# Patient Record
Sex: FEMALE | Race: OTHER | Employment: UNEMPLOYED | URBAN - METROPOLITAN AREA
[De-identification: names, ages, dates, MRNs, and addresses within clinical notes are randomized per-mention and may not be internally consistent; named-entity substitution may affect disease eponyms.]

---

## 2024-02-24 ENCOUNTER — HOSPITAL ENCOUNTER (EMERGENCY)
Age: 34
Discharge: HOME | End: 2024-02-24
Attending: STUDENT IN AN ORGANIZED HEALTH CARE EDUCATION/TRAINING PROGRAM
Payer: COMMERCIAL

## 2024-02-24 ENCOUNTER — APPOINTMENT (OUTPATIENT)
Dept: CT IMAGING | Age: 34
End: 2024-02-24
Payer: COMMERCIAL

## 2024-02-24 VITALS
DIASTOLIC BLOOD PRESSURE: 109 MMHG | SYSTOLIC BLOOD PRESSURE: 158 MMHG | OXYGEN SATURATION: 97 % | HEART RATE: 97 BPM | TEMPERATURE: 98.5 F | RESPIRATION RATE: 18 BRPM

## 2024-02-24 DIAGNOSIS — J02.0 STREPTOCOCCAL SORE THROAT: Primary | ICD-10-CM

## 2024-02-24 DIAGNOSIS — I10 ESSENTIAL HYPERTENSION: ICD-10-CM

## 2024-02-24 DIAGNOSIS — E04.1 THYROID NODULE GREATER THAN OR EQUAL TO 1.5 CM IN DIAMETER INCIDENTALLY NOTED ON IMAGING STUDY: ICD-10-CM

## 2024-02-24 LAB
ALBUMIN SERPL-MCNC: 4.8 GM/DL (ref 3.4–5)
ALP BLD-CCNC: 84 IU/L (ref 40–129)
ALT SERPL-CCNC: 15 U/L (ref 10–40)
ANION GAP SERPL CALCULATED.3IONS-SCNC: 13 MMOL/L (ref 7–16)
AST SERPL-CCNC: 18 IU/L (ref 15–37)
BASOPHILS ABSOLUTE: 0 K/CU MM
BASOPHILS RELATIVE PERCENT: 0.4 % (ref 0–1)
BILIRUB SERPL-MCNC: 0.3 MG/DL (ref 0–1)
BUN SERPL-MCNC: 10 MG/DL (ref 6–23)
CALCIUM SERPL-MCNC: 9.2 MG/DL (ref 8.3–10.6)
CHLORIDE BLD-SCNC: 103 MMOL/L (ref 99–110)
CO2: 24 MMOL/L (ref 21–32)
CREAT SERPL-MCNC: 0.8 MG/DL (ref 0.6–1.1)
DIFFERENTIAL TYPE: ABNORMAL
EOSINOPHILS ABSOLUTE: 0.1 K/CU MM
EOSINOPHILS RELATIVE PERCENT: 0.6 % (ref 0–3)
GFR SERPL CREATININE-BSD FRML MDRD: >60 ML/MIN/1.73M2
GLUCOSE SERPL-MCNC: 86 MG/DL (ref 70–99)
HCT VFR BLD CALC: 42.7 % (ref 37–47)
HEMOGLOBIN: 13.6 GM/DL (ref 12.5–16)
IMMATURE NEUTROPHIL %: 0.3 % (ref 0–0.43)
INFLUENZA A ANTIGEN: NOT DETECTED
INFLUENZA B ANTIGEN: NOT DETECTED
LYMPHOCYTES ABSOLUTE: 2.9 K/CU MM
LYMPHOCYTES RELATIVE PERCENT: 38.1 % (ref 24–44)
Lab: NORMAL
MCH RBC QN AUTO: 28.7 PG (ref 27–31)
MCHC RBC AUTO-ENTMCNC: 31.9 % (ref 32–36)
MCV RBC AUTO: 90.1 FL (ref 78–100)
MONOCYTES ABSOLUTE: 0.8 K/CU MM
MONOCYTES RELATIVE PERCENT: 10.6 % (ref 0–4)
NUCLEATED RBC %: 0 %
PDW BLD-RTO: 13.2 % (ref 11.7–14.9)
PLATELET # BLD: 304 K/CU MM (ref 140–440)
PMV BLD AUTO: 9.1 FL (ref 7.5–11.1)
POTASSIUM SERPL-SCNC: 3.8 MMOL/L (ref 3.5–5.1)
RBC # BLD: 4.74 M/CU MM (ref 4.2–5.4)
SARS-COV-2 RDRP RESP QL NAA+PROBE: NOT DETECTED
SEGMENTED NEUTROPHILS ABSOLUTE COUNT: 3.9 K/CU MM
SEGMENTED NEUTROPHILS RELATIVE PERCENT: 50 % (ref 36–66)
SODIUM BLD-SCNC: 140 MMOL/L (ref 135–145)
SOURCE: NORMAL
SPECIMEN: NORMAL
STREP A DIRECT SCREEN: NEGATIVE
TOTAL IMMATURE NEUTOROPHIL: 0.02 K/CU MM
TOTAL NUCLEATED RBC: 0 K/CU MM
TOTAL PROTEIN: 8 GM/DL (ref 6.4–8.2)
WBC # BLD: 7.7 K/CU MM (ref 4–10.5)

## 2024-02-24 PROCEDURE — 85025 COMPLETE CBC W/AUTO DIFF WBC: CPT

## 2024-02-24 PROCEDURE — 6370000000 HC RX 637 (ALT 250 FOR IP)

## 2024-02-24 PROCEDURE — 70491 CT SOFT TISSUE NECK W/DYE: CPT

## 2024-02-24 PROCEDURE — 6360000004 HC RX CONTRAST MEDICATION

## 2024-02-24 PROCEDURE — 80053 COMPREHEN METABOLIC PANEL: CPT

## 2024-02-24 PROCEDURE — 87081 CULTURE SCREEN ONLY: CPT

## 2024-02-24 PROCEDURE — 87635 SARS-COV-2 COVID-19 AMP PRB: CPT

## 2024-02-24 PROCEDURE — 87502 INFLUENZA DNA AMP PROBE: CPT

## 2024-02-24 PROCEDURE — 87430 STREP A AG IA: CPT

## 2024-02-24 PROCEDURE — 99285 EMERGENCY DEPT VISIT HI MDM: CPT

## 2024-02-24 PROCEDURE — 6360000002 HC RX W HCPCS

## 2024-02-24 RX ORDER — IBUPROFEN 600 MG/1
600 TABLET ORAL 3 TIMES DAILY PRN
Qty: 30 TABLET | Refills: 0 | Status: SHIPPED | OUTPATIENT
Start: 2024-02-24

## 2024-02-24 RX ORDER — AMOXICILLIN AND CLAVULANATE POTASSIUM 875; 125 MG/1; MG/1
1 TABLET, FILM COATED ORAL 2 TIMES DAILY
Qty: 14 TABLET | Refills: 0 | Status: SHIPPED | OUTPATIENT
Start: 2024-02-24 | End: 2024-03-02

## 2024-02-24 RX ORDER — DEXAMETHASONE SODIUM PHOSPHATE 10 MG/ML
10 INJECTION, SOLUTION INTRAMUSCULAR; INTRAVENOUS ONCE
Status: COMPLETED | OUTPATIENT
Start: 2024-02-24 | End: 2024-02-24

## 2024-02-24 RX ORDER — AMOXICILLIN AND CLAVULANATE POTASSIUM 875; 125 MG/1; MG/1
1 TABLET, FILM COATED ORAL ONCE
Status: COMPLETED | OUTPATIENT
Start: 2024-02-24 | End: 2024-02-24

## 2024-02-24 RX ORDER — IBUPROFEN 600 MG/1
600 TABLET ORAL ONCE
Status: COMPLETED | OUTPATIENT
Start: 2024-02-24 | End: 2024-02-24

## 2024-02-24 RX ADMIN — IOPAMIDOL 75 ML: 755 INJECTION, SOLUTION INTRAVENOUS at 15:21

## 2024-02-24 RX ADMIN — AMOXICILLIN AND CLAVULANATE POTASSIUM 1 TABLET: 875; 125 TABLET, FILM COATED ORAL at 14:29

## 2024-02-24 RX ADMIN — DEXAMETHASONE SODIUM PHOSPHATE 10 MG: 10 INJECTION, SOLUTION INTRAMUSCULAR; INTRAVENOUS at 14:30

## 2024-02-24 RX ADMIN — IBUPROFEN 600 MG: 600 TABLET, FILM COATED ORAL at 14:29

## 2024-02-24 ASSESSMENT — PAIN SCALES - GENERAL: PAINLEVEL_OUTOF10: 1

## 2024-02-24 NOTE — ED PROVIDER NOTES
THIS IS MY FERNY SUPERVISORY AND SHARED VISIT NOTE    I independently examined and evaluated Ene Fernnádez.    In brief, coming with a sore throat.  Has been ongoing for 7 to 9 days.  Has not been evaluated for this so far.  Patient is otherwise healthy takes no medications.    Focused exam revealed, bilateral 2+ tonsillar swelling, I do see a exudate on the left tonsil.  Uvula is midline.  Posterior oropharynx and tonsils are erythematous.  No stridor, no wheezing no respiratory distress.  Lung sounds are clear to auscultation.  Heart reveals regular rate and rhythm without murmur.  Patient does have swelling to the right side of the neck over the thyroid, no overlying skin changes.        CC/HPI Summary, DDx, ED Course, and Reassessment:     Differential includes viral pharyngitis, strep pharyngitis, tonsillar abscess.  Patient was breathing heavily and had upper airway noises therefore CT scan was ordered to rule out deep space abscess.  Patient was administered Decadron, ibuprofen and Augmentin.  Personally I believe this is likely a viral pharyngitis.    Lab work reviewed and is unremarkable.  She has no white count or fever.  CT scan rules out tonsillar abscess does show tonsillitis/pharyngitis but no abscess.  Does find a large right thyroid nodule measuring 6.9 cm with mild localized mass effect.  General surgery was consulted by nurse practitioner to facilitate outpatient workup and reevaluation.  COVID and flu were negative.  Patient was temporarily placed on antibiotics while strep culture pending.    Patient was given the following medications:  Medications   dexAMETHasone (DECADRON) Oral 10 mg (10 mg Oral Given 2/24/24 1430)   ibuprofen (ADVIL;MOTRIN) tablet 600 mg (600 mg Oral Given 2/24/24 1429)   amoxicillin-clavulanate (AUGMENTIN) 875-125 MG per tablet 1 tablet (1 tablet Oral Given 2/24/24 1429)   iopamidol (ISOVUE-370) 76 % injection 75 mL (75 mLs IntraVENous Given 2/24/24 1521)     Discharge

## 2024-02-24 NOTE — ED PROVIDER NOTES
[facility] EMERGENCY DEPARTMENT  EMERGENCY DEPARTMENT ENCOUNTER        Pt Name: Ene Fernández  MRN: [id number]  Birthdate 1990  Date of evaluation: 2/24/2024  Provider: ROSE Daley CNP  PCP: No primary care provider on file.  Note Started: 4:26 PM EST 2/24/24       I have seen and evaluated this patient with my supervising physician Brigido France DO.      CHIEF COMPLAINT       Chief Complaint   Patient presents with    Pharyngitis     X8 days       HISTORY OF PRESENT ILLNESS: 1 or more Elements     History From: Patient    Limitations to history : Language [place] Creole    Social Determinants Significantly Affecting Health : None    Chief Complaint: Sore throat    Ene Fernández is a 33 y.o. female who presents with no significant medical history.  States that for the past 8 days she has had difficulty swallowing as well as a sore throat.  States is that she feels like her neck is swollen.  Denies any fever or respiratory distress.  Denies any cough or congestion.  Denies any ear pain or sinus pressure.  Denies any recent antibiotic use.  Denies any medication use or surgical history or medical history.  States she is still able to eat and drink and swallow but it is getting harder as the days progressed.  She states she is up-to-date on her vaccinations.  Denies any rashes dysuria hematuria nausea vomiting or diarrhea.    Nursing Notes were all reviewed and agreed with or any disagreements were addressed in the HPI.    REVIEW OF SYSTEMS :      Review of Systems    Positives and Pertinent negatives as per HPI.     SURGICAL HISTORY   History reviewed. No pertinent surgical history.    CURRENTMEDICATIONS       Discharge Medication List as of 2/24/2024  5:40 PM          ALLERGIES     Patient has no known allergies.    FAMILYHISTORY     History reviewed. No pertinent family history.     SOCIAL HISTORY       Social History     Tobacco Use    Smoking status:  below, if available at the time of this note:    CT SOFT TISSUE NECK W CONTRAST   Final Result   1. Findings consistent with acute tonsillitis/pharyngitis.  No evidence of   abscess.   2. Large right thyroid nodule measuring up to 6.9 cm with mild localized mass   effect.  Recommend nonemergent thyroid ultrasound for further evaluation as   an outpatient.   3. Mildly enlarged left jugulodigastric lymph node is likely reactive.  No   abnormally enlarged cervical lymph nodes within level 4 lymph node station.           No results found.     No results found.        PAST MEDICAL HISTORY      has no past medical history on file.     EMERGENCY DEPARTMENT COURSE and DIFFERENTIAL DIAGNOSIS/MDM:   Vitals:    Vitals:    02/24/24 1500 02/24/24 1530 02/24/24 1600 02/24/24 1741   BP: (!) 165/100 (!) 136/108 (!) 137/103 (!) 158/109   Pulse:    97   Resp:    18   Temp:       TempSrc:       SpO2:    97%       Patient was given the following medications:  Medications   dexAMETHasone (DECADRON) Oral 10 mg (10 mg Oral Given 2/24/24 1430)   ibuprofen (ADVIL;MOTRIN) tablet 600 mg (600 mg Oral Given 2/24/24 1429)   amoxicillin-clavulanate (AUGMENTIN) 875-125 MG per tablet 1 tablet (1 tablet Oral Given 2/24/24 1429)   iopamidol (ISOVUE-370) 76 % injection 75 mL (75 mLs IntraVENous Given 2/24/24 1521)             Is this patient to be included in the SEP-1 Core Measure due to severe sepsis or septic shock?   No   Exclusion criteria - the patient is NOT to be included for SEP-1 Core Measure due to:  2+ SIRS criteria are not met        Chronic Conditions affecting care:    has no past medical history on file.    CONSULTS: (Who and What was discussed)  IP CONSULT TO GENERAL SURGERY  Consult to general surgeon Dr diaz for CT findings of thyroid nodule, Dr. Jimenez stated that the patient needed to be seen as outpatient, referral to Dr. Mota for endocrinology as well as Dr. Cantu for surgery follow-up.    Records Reviewed (External and

## 2024-02-26 LAB
CULTURE: NORMAL
Lab: NORMAL
SPECIMEN: NORMAL
STREP A DIRECT SCREEN: NEGATIVE

## 2024-03-14 ENCOUNTER — OFFICE VISIT (OUTPATIENT)
Dept: SURGERY | Age: 34
End: 2024-03-14

## 2024-03-14 VITALS
WEIGHT: 186.9 LBS | DIASTOLIC BLOOD PRESSURE: 96 MMHG | OXYGEN SATURATION: 99 % | HEART RATE: 110 BPM | HEIGHT: 62 IN | SYSTOLIC BLOOD PRESSURE: 144 MMHG | BODY MASS INDEX: 34.39 KG/M2

## 2024-03-14 DIAGNOSIS — E04.9 GOITER: ICD-10-CM

## 2024-03-14 ASSESSMENT — PATIENT HEALTH QUESTIONNAIRE - PHQ9
SUM OF ALL RESPONSES TO PHQ QUESTIONS 1-9: 0
2. FEELING DOWN, DEPRESSED OR HOPELESS: NOT AT ALL
1. LITTLE INTEREST OR PLEASURE IN DOING THINGS: NOT AT ALL
SUM OF ALL RESPONSES TO PHQ QUESTIONS 1-9: 0
SUM OF ALL RESPONSES TO PHQ9 QUESTIONS 1 & 2: 0

## 2024-03-15 ENCOUNTER — HOSPITAL ENCOUNTER (OUTPATIENT)
Dept: ULTRASOUND IMAGING | Age: 34
Discharge: HOME | End: 2024-03-15
Attending: SURGERY
Payer: COMMERCIAL

## 2024-03-15 ENCOUNTER — HOSPITAL ENCOUNTER (OUTPATIENT)
Age: 34
Discharge: HOME | End: 2024-03-15
Attending: SURGERY
Payer: COMMERCIAL

## 2024-03-15 DIAGNOSIS — E04.9 GOITER: ICD-10-CM

## 2024-03-15 LAB — TSH SERPL DL<=0.005 MIU/L-ACNC: 1.6 UIU/ML (ref 0.27–4.2)

## 2024-03-15 PROCEDURE — 84436 ASSAY OF TOTAL THYROXINE: CPT

## 2024-03-15 PROCEDURE — 84443 ASSAY THYROID STIM HORMONE: CPT

## 2024-03-15 PROCEDURE — 36415 COLL VENOUS BLD VENIPUNCTURE: CPT

## 2024-03-15 PROCEDURE — 76536 US EXAM OF HEAD AND NECK: CPT

## 2024-03-15 PROCEDURE — 84480 ASSAY TRIIODOTHYRONINE (T3): CPT

## 2024-03-17 LAB
T3 SERPL-MCNC: 142 NG/DL (ref 80–200)
T4 SERPL-MCNC: 5.96 UG/DL (ref 4.5–11.7)

## 2024-04-10 ENCOUNTER — TELEPHONE (OUTPATIENT)
Dept: BARIATRICS/WEIGHT MGMT | Age: 34
End: 2024-04-10

## 2024-04-10 DIAGNOSIS — E04.9 GOITER: Primary | ICD-10-CM

## 2024-04-11 NOTE — PROGRESS NOTES
IR Procedure at [facility]: Spoke with patient through  Vick ID # [id number], and patient will arrive at 0630 at [facility] on 4/15/2024 for her procedure at 0830. Also went over below instructions.         (Paracentesis, Thoracentesis, Thyroid Bx and Injections may have a light breakfast)  2.   Follow your directions as prescribed by the doctor for your procedure and medications.  3.   Consult your provider as to when to stop blood thinner  4.   Do not take any pain medication within 6 hours of your procedure  5.   Do not drink any alcoholic beverages or use any street drugs 24 hours before procedure.  6.   Please wear simple, loose fitting clothing to the hospital.  Do not bring valuables (money,             credit cards, checkbooks, etc.)     7.   If you  have a Living Will and Durable Power of  for Healthcare, please bring in a copy.  8.   Please bring picture ID,  insurance card, paperwork from the doctors office            (H & P, Consent,  & card for implantable devices).  9.   Report to the information desk on the ground floor.  10. Take a shower the night before or morning of your procedure, do not apply any lotion, oil or powder.  11. If you are going to be sedated for the procedure, you will need a responsible adult to drive you home.

## 2024-04-15 ENCOUNTER — HOSPITAL ENCOUNTER (OUTPATIENT)
Dept: INTERVENTIONAL RADIOLOGY/VASCULAR | Age: 34
Discharge: HOME | End: 2024-04-15
Payer: COMMERCIAL

## 2024-04-15 VITALS
HEART RATE: 87 BPM | DIASTOLIC BLOOD PRESSURE: 98 MMHG | SYSTOLIC BLOOD PRESSURE: 137 MMHG | RESPIRATION RATE: 16 BRPM | OXYGEN SATURATION: 100 %

## 2024-04-15 DIAGNOSIS — E04.9 GOITER: ICD-10-CM

## 2024-04-15 PROCEDURE — 10005 FNA BX W/US GDN 1ST LES: CPT

## 2024-04-15 PROCEDURE — 2500000003 HC RX 250 WO HCPCS: Performed by: RADIOLOGY

## 2024-04-15 PROCEDURE — 88173 CYTOPATH EVAL FNA REPORT: CPT

## 2024-04-15 PROCEDURE — 88305 TISSUE EXAM BY PATHOLOGIST: CPT

## 2024-04-15 PROCEDURE — 88172 CYTP DX EVAL FNA 1ST EA SITE: CPT

## 2024-04-15 PROCEDURE — 88177 CYTP FNA EVAL EA ADDL: CPT

## 2024-04-15 PROCEDURE — 7100000010 HC PHASE II RECOVERY - FIRST 15 MIN

## 2024-04-15 PROCEDURE — 2709999900 IR BIOPSY THYROID PERC CORE NEEDLE

## 2024-04-15 RX ORDER — LIDOCAINE HYDROCHLORIDE 10 MG/ML
INJECTION, SOLUTION EPIDURAL; INFILTRATION; INTRACAUDAL; PERINEURAL PRN
Status: COMPLETED | OUTPATIENT
Start: 2024-04-15 | End: 2024-04-15

## 2024-04-15 RX ADMIN — LIDOCAINE HYDROCHLORIDE 5 ML: 10 INJECTION, SOLUTION EPIDURAL; INFILTRATION; INTRACAUDAL; PERINEURAL at 08:46

## 2024-04-15 NOTE — PROGRESS NOTES
TRANSFER - OUT REPORT:    Verbal report given to Kailey on Junie Pradeep being transferred to [facility] for routine progression of patient care       Report consisted of patient's Situation, Background, Assessment and   Recommendations(SBAR).     Information from the following report(s) Nurse Handoff Report was reviewed with the receiving nurse.    Opportunity for questions and clarification was provided.      Patient transported with:   Registered Nurse

## 2024-04-15 NOTE — BRIEF OP NOTE
Brief Postoperative Note      Patient: Ene Fernández  YOB: 1990  MRN: [id number]    Date of Procedure: 4/15/2024    PROCEDURE:  IR BIOPSY THYROID PERC CORE NEEDLE    DATE:  4/15/2024 8:54 AM    HISTORY:       Nontoxic goiter, unspecified  Nontoxic goiter, unspecified / Reason for exam:->See US from 3/15/24    PROVIDER: Dick Jewell MD    COMPARISON: 3/15/2024    TECHNIQUE: Ultrasound-guided fine-needle aspiration of a dominant right thyroid nodule    SEDATION: 1% lidocaine solution.    FINDINGS: The right neck was prepped and draped in the usual sterile fashion. The appropriate entry site was identified with ultrasound. The skin was anesthetized with 1% lidocaine solution. A 25-gauge needle was then advanced into the right thyroid nodule under ultrasound guidance. Multiple fine-needle aspirations were obtained. Specimens were assessed by a pathologist. Once complete, the final needle was removed and a sterile bandage was applied.      IMPRESSION: Technically successful fine-needle aspiration of a right thyroid nodule.    Electronically signed by Dick Jewell MD on 4/15/2024 at 9:53 AM

## 2024-04-15 NOTE — PROGRESS NOTES
Pt arrived for planned thyroid biopsy.   used throughout preprocedure.  Consent will be obtained once physican educates her regarding biopsy.

## 2024-04-25 ENCOUNTER — OFFICE VISIT (OUTPATIENT)
Dept: SURGERY | Age: 34
End: 2024-04-25
Payer: COMMERCIAL

## 2024-04-25 ENCOUNTER — TELEPHONE (OUTPATIENT)
Dept: SURGERY | Age: 34
End: 2024-04-25

## 2024-04-25 VITALS
OXYGEN SATURATION: 100 % | WEIGHT: 190.8 LBS | HEART RATE: 78 BPM | HEIGHT: 62 IN | BODY MASS INDEX: 35.11 KG/M2 | DIASTOLIC BLOOD PRESSURE: 80 MMHG | SYSTOLIC BLOOD PRESSURE: 120 MMHG

## 2024-04-25 DIAGNOSIS — E04.1 RIGHT THYROID NODULE: Primary | ICD-10-CM

## 2024-04-25 PROCEDURE — 99213 OFFICE O/P EST LOW 20 MIN: CPT | Performed by: PHYSICIAN ASSISTANT

## 2024-04-25 ASSESSMENT — ENCOUNTER SYMPTOMS
CONSTIPATION: 0
BACK PAIN: 0
CHOKING: 0
VOICE CHANGE: 1
ANAL BLEEDING: 0
SORE THROAT: 0
STRIDOR: 0
APNEA: 0
COLOR CHANGE: 0
TROUBLE SWALLOWING: 1
EYE ITCHING: 0
EYE REDNESS: 0
PHOTOPHOBIA: 0

## 2024-04-25 NOTE — PROGRESS NOTES
Chief Complaint   Patient presents with    Follow-up     F/u us results 3/15/24 bx 4/15/24      Ptq is [place] Creole speaking. The use of an  was utilized throughout the entirety of the visit today.    SUBJECTIVE:  HPI: Patient presents for evaluation ofsolitary thyroid nodule. Current symptoms include palpitations, goiter, trouble swallowing and anterior neck pain. Recent Ultrasound shows large R thyroid nodule. Patient denies history of radiation to neck.    Pt with recent IR thyroid nodule bx. Pathology was reviewed:  Final Cytologic Diagnosis:   Right Thyroid Nodule Fine Needle Aspirate cytology with cell   block:   - Negative for malignancy.   -  Consistent with follicular nodular disease (includes   adenomatoid nodule, colloid nodule, etc.)     Thyroid labs are normal.    I have reviewed the patient's(pertinent information to this visit) medical history, family history(scanned in  the Media tab under \"patient questioner\"), social history and reviewof systems with the patient today in the office.       Past Surgical History:   Procedure Laterality Date    IR BIOPSY THYROID PERC CORE NEEDLE  4/15/2024    IR BIOPSY THYROID PERC CORE NEEDLE 4/15/2024 [facility]    NO PAST SURGERIES       No past medical history on file.  Family History   Problem Relation Age of Onset    Asthma Mother      Social History     Socioeconomic History    Marital status: Single     Spouse name: Not on file    Number of children: Not on file    Years of education: Not on file    Highest education level: Not on file   Occupational History    Not on file   Tobacco Use    Smoking status: Never    Smokeless tobacco: Never   Substance and Sexual Activity    Alcohol use: Never    Drug use: Never    Sexual activity: Not on file   Other Topics Concern    Not on file   Social History Narrative    Not on file     Social Determinants of Health     Financial Resource Strain: Not on file   Food Insecurity: Not on file

## 2024-04-25 NOTE — H&P (VIEW-ONLY)
Chief Complaint   Patient presents with    Follow-up     F/u us results 3/15/24 bx 4/15/24      Ptq is [place] Creole speaking. The use of an  was utilized throughout the entirety of the visit today.    SUBJECTIVE:  HPI: Patient presents for evaluation ofsolitary thyroid nodule. Current symptoms include palpitations, goiter, trouble swallowing and anterior neck pain. Recent Ultrasound shows large R thyroid nodule. Patient denies history of radiation to neck.    Pt with recent IR thyroid nodule bx. Pathology was reviewed:  Final Cytologic Diagnosis:   Right Thyroid Nodule Fine Needle Aspirate cytology with cell   block:   - Negative for malignancy.   -  Consistent with follicular nodular disease (includes   adenomatoid nodule, colloid nodule, etc.)     Thyroid labs are normal.    I have reviewed the patient's(pertinent information to this visit) medical history, family history(scanned in  the Media tab under \"patient questioner\"), social history and reviewof systems with the patient today in the office.       Past Surgical History:   Procedure Laterality Date    IR BIOPSY THYROID PERC CORE NEEDLE  4/15/2024    IR BIOPSY THYROID PERC CORE NEEDLE 4/15/2024 [facility]    NO PAST SURGERIES       No past medical history on file.  Family History   Problem Relation Age of Onset    Asthma Mother      Social History     Socioeconomic History    Marital status: Single     Spouse name: Not on file    Number of children: Not on file    Years of education: Not on file    Highest education level: Not on file   Occupational History    Not on file   Tobacco Use    Smoking status: Never    Smokeless tobacco: Never   Substance and Sexual Activity    Alcohol use: Never    Drug use: Never    Sexual activity: Not on file   Other Topics Concern    Not on file   Social History Narrative    Not on file     Social Determinants of Health     Financial Resource Strain: Not on file   Food Insecurity: Not on file

## 2024-04-25 NOTE — TELEPHONE ENCOUNTER
SPOKE TO  Ene Fernández REGARDING SURGERY (RIGHT POSS LEFT THYROIDECTOMY) SCHEDULED @ [facility]. NOTIFIED OF DATES, TIMES AND LOCATION    PHONE ASSESSMENT   SURGERY - 5/10/24 @ 2:15 ARRIVAL @ 12:15  P/O - 5/23 @ 1:00    NPO AFTER MIDNIGHT    HOLD BLOOD THINNERS - NA

## 2024-05-09 ENCOUNTER — ANESTHESIA EVENT (OUTPATIENT)
Dept: OPERATING ROOM | Age: 34
DRG: 404 | End: 2024-05-09
Payer: COMMERCIAL

## 2024-05-09 NOTE — ANESTHESIA PRE PROCEDURE
Department of Anesthesiology  Preprocedure Note       Name:  Ene Fernández   Age:  33 y.o.  :  1990                                          MRN:  [id number]         Date:  2024      Surgeon: Surgeon(s):  Feliciano Cantu MD    Procedure: Procedure(s):  RIGHT POSSIBLE LEFT THYROID LOBECTOMY    Medications prior to admission:   Prior to Admission medications    Medication Sig Start Date End Date Taking? Authorizing Provider   ibuprofen (ADVIL;MOTRIN) 600 MG tablet Take 1 tablet by mouth 3 times daily as needed for Pain  Patient not taking: Reported on 4/15/2024 2/24/24   Eva Nelson, APRN - CNP       Current medications:    No current facility-administered medications for this encounter.     Current Outpatient Medications   Medication Sig Dispense Refill   • ibuprofen (ADVIL;MOTRIN) 600 MG tablet Take 1 tablet by mouth 3 times daily as needed for Pain (Patient not taking: Reported on 4/15/2024) 30 tablet 0       Allergies:  No Known Allergies    Problem List:  There is no problem list on file for this patient.      Past Medical History:  History reviewed. No pertinent past medical history.    Past Surgical History:        Procedure Laterality Date   • IR BIOPSY THYROID PERC CORE NEEDLE  4/15/2024    IR BIOPSY THYROID PERC CORE NEEDLE 4/15/2024 [facility]   • NO PAST SURGERIES         Social History:    Social History     Tobacco Use   • Smoking status: Never   • Smokeless tobacco: Never   Substance Use Topics   • Alcohol use: Never                                Counseling given: Not Answered      Vital Signs (Current):   Vitals:    24 0918   Weight: 86.2 kg (190 lb)   Height: 1.575 m (5' 2\")                                              BP Readings from Last 3 Encounters:   24 120/80   04/15/24 (!) 137/98   24 (!) 144/96       NPO Status:                                                                                 BMI:   Wt Readings from Last 3 Encounters:   24 86.5  kg (190 lb 12.8 oz)   03/14/24 84.8 kg (186 lb 14.4 oz)     Body mass index is 34.75 kg/m².    CBC:   Lab Results   Component Value Date/Time    WBC 7.7 02/24/2024 02:29 PM    RBC 4.74 02/24/2024 02:29 PM    HGB 13.6 02/24/2024 02:29 PM    HCT 42.7 02/24/2024 02:29 PM    MCV 90.1 02/24/2024 02:29 PM    RDW 13.2 02/24/2024 02:29 PM     02/24/2024 02:29 PM       CMP:   Lab Results   Component Value Date/Time     02/24/2024 02:29 PM    K 3.8 02/24/2024 02:29 PM     02/24/2024 02:29 PM    CO2 24 02/24/2024 02:29 PM    BUN 10 02/24/2024 02:29 PM    CREATININE 0.8 02/24/2024 02:29 PM    LABGLOM >60 02/24/2024 02:29 PM    GLUCOSE 86 02/24/2024 02:29 PM    CALCIUM 9.2 02/24/2024 02:29 PM    BILITOT 0.3 02/24/2024 02:29 PM    ALKPHOS 84 02/24/2024 02:29 PM    AST 18 02/24/2024 02:29 PM    ALT 15 02/24/2024 02:29 PM       POC Tests: No results for input(s): \"POCGLU\", \"POCNA\", \"POCK\", \"POCCL\", \"POCBUN\", \"POCHEMO\", \"POCHCT\" in the last 72 hours.    Coags: No results found for: \"PROTIME\", \"INR\", \"APTT\"    HCG (If Applicable): No results found for: \"PREGTESTUR\", \"PREGSERUM\", \"HCG\", \"HCGQUANT\"     ABGs: No results found for: \"PHART\", \"PO2ART\", \"JRF5XMC\", \"XQS7ZAY\", \"BEART\", \"U7HDVNXI\"     Type & Screen (If Applicable):  No results found for: \"LABABO\"    Drug/Infectious Status (If Applicable):  No results found for: \"HIV\", \"HEPCAB\"    COVID-19 Screening (If Applicable):   Lab Results   Component Value Date/Time    COVID19 NOT DETECTED 02/24/2024 11:55 AM           Anesthesia Evaluation  Patient summary reviewed  Airway: Mallampati: III  TM distance: >3 FB   Neck ROM: full  Mouth opening: > = 3 FB   Dental: normal exam         Pulmonary:Negative Pulmonary ROS and normal exam                               Cardiovascular:Negative CV ROS  Exercise tolerance: good (>4 METS)           Beta Blocker:  Not on Beta Blocker         Neuro/Psych:   Negative Neuro/Psych ROS              GI/Hepatic/Renal: Neg GI/Hepatic/Renal

## 2024-05-09 NOTE — PROGRESS NOTES
.Surgery @ [facility] on 5/10/24 1245 arrival 1045    NOTHING TO EAT OR DRINK AFTER MIDNIGHT DAY OF SURGERY    1. Enter thru the hospital main entrance on day of surgery, check in at the Information Desk. If you arrive prior to 6:00am, enter thru the ER entrance.    2. Follow the directions as prescribed by the doctor for your procedure and medications.         Morning of surgery take: no medications          Stop vitamins, supplements and NSAIDS:  today     3. Check with your Doctor regarding stopping blood thinners and follow their instructions.    4. Do not smoke, vape or use chewing tobacco morning of surgery. Do not drink any alcoholic beverages 24 hours prior to surgery.       This includes NA Beer. No street drugs 7 days prior to surgery.    5. If you have dentures, contacts of glasses they will be removed before going to the OR; please bring a case.    6. Please bring picture ID, insurance card, paperwork from the doctor’s office (H & P, Consent, & card for implantable devices).    7. Take a shower with an antibacterial soap the night before surgery and the morning of surgery. Do not put anything on your skin      After your morning shower.    8. You will need a responsible adult to drive you home and check on you after surgery.

## 2024-05-10 ENCOUNTER — HOSPITAL ENCOUNTER (INPATIENT)
Age: 34
LOS: 2 days | Discharge: HOME | DRG: 404 | End: 2024-05-12
Attending: SURGERY | Admitting: SURGERY
Payer: COMMERCIAL

## 2024-05-10 ENCOUNTER — ANESTHESIA (OUTPATIENT)
Dept: OPERATING ROOM | Age: 34
DRG: 404 | End: 2024-05-10
Payer: COMMERCIAL

## 2024-05-10 DIAGNOSIS — E04.1 THYROID NODULE: ICD-10-CM

## 2024-05-10 LAB
CALCIUM SERPL-MCNC: 8.8 MG/DL (ref 8.3–10.6)
PREGNANCY TEST URINE, POC: NEGATIVE

## 2024-05-10 PROCEDURE — 3600000013 HC SURGERY LEVEL 3 ADDTL 15MIN: Performed by: SURGERY

## 2024-05-10 PROCEDURE — 6370000000 HC RX 637 (ALT 250 FOR IP): Performed by: STUDENT IN AN ORGANIZED HEALTH CARE EDUCATION/TRAINING PROGRAM

## 2024-05-10 PROCEDURE — 2720000010 HC SURG SUPPLY STERILE: Performed by: SURGERY

## 2024-05-10 PROCEDURE — 7100000000 HC PACU RECOVERY - FIRST 15 MIN: Performed by: SURGERY

## 2024-05-10 PROCEDURE — 36415 COLL VENOUS BLD VENIPUNCTURE: CPT

## 2024-05-10 PROCEDURE — 0GTH0ZZ RESECTION OF RIGHT THYROID GLAND LOBE, OPEN APPROACH: ICD-10-PCS | Performed by: SURGERY

## 2024-05-10 PROCEDURE — 88331 PATH CONSLTJ SURG 1 BLK 1SPC: CPT | Performed by: PATHOLOGY

## 2024-05-10 PROCEDURE — 88307 TISSUE EXAM BY PATHOLOGIST: CPT | Performed by: PATHOLOGY

## 2024-05-10 PROCEDURE — 2580000003 HC RX 258: Performed by: REGISTERED NURSE

## 2024-05-10 PROCEDURE — 2580000003 HC RX 258: Performed by: ANESTHESIOLOGY

## 2024-05-10 PROCEDURE — G0378 HOSPITAL OBSERVATION PER HR: HCPCS

## 2024-05-10 PROCEDURE — 7100000001 HC PACU RECOVERY - ADDTL 15 MIN: Performed by: SURGERY

## 2024-05-10 PROCEDURE — 6360000002 HC RX W HCPCS: Performed by: REGISTERED NURSE

## 2024-05-10 PROCEDURE — 2580000003 HC RX 258: Performed by: PHYSICIAN ASSISTANT

## 2024-05-10 PROCEDURE — 82310 ASSAY OF CALCIUM: CPT

## 2024-05-10 PROCEDURE — 6360000002 HC RX W HCPCS: Performed by: SURGERY

## 2024-05-10 PROCEDURE — 3700000001 HC ADD 15 MINUTES (ANESTHESIA): Performed by: SURGERY

## 2024-05-10 PROCEDURE — 3700000000 HC ANESTHESIA ATTENDED CARE: Performed by: SURGERY

## 2024-05-10 PROCEDURE — 2500000003 HC RX 250 WO HCPCS: Performed by: REGISTERED NURSE

## 2024-05-10 PROCEDURE — 2580000003 HC RX 258: Performed by: STUDENT IN AN ORGANIZED HEALTH CARE EDUCATION/TRAINING PROGRAM

## 2024-05-10 PROCEDURE — 6360000002 HC RX W HCPCS: Performed by: PHYSICIAN ASSISTANT

## 2024-05-10 PROCEDURE — 1200000000 HC SEMI PRIVATE

## 2024-05-10 PROCEDURE — 3600000003 HC SURGERY LEVEL 3 BASE: Performed by: SURGERY

## 2024-05-10 PROCEDURE — 60220 PARTIAL REMOVAL OF THYROID: CPT | Performed by: SURGERY

## 2024-05-10 PROCEDURE — 81025 URINE PREGNANCY TEST: CPT

## 2024-05-10 PROCEDURE — 2709999900 HC NON-CHARGEABLE SUPPLY: Performed by: SURGERY

## 2024-05-10 RX ORDER — HYDROMORPHONE HYDROCHLORIDE 1 MG/ML
0.5 INJECTION, SOLUTION INTRAMUSCULAR; INTRAVENOUS; SUBCUTANEOUS EVERY 4 HOURS PRN
Status: DISCONTINUED | OUTPATIENT
Start: 2024-05-10 | End: 2024-05-12 | Stop reason: HOSPADM

## 2024-05-10 RX ORDER — SODIUM CHLORIDE 9 MG/ML
INJECTION, SOLUTION INTRAVENOUS PRN
Status: DISCONTINUED | OUTPATIENT
Start: 2024-05-10 | End: 2024-05-12 | Stop reason: HOSPADM

## 2024-05-10 RX ORDER — OXYCODONE HYDROCHLORIDE 10 MG/1
10 TABLET ORAL EVERY 4 HOURS PRN
Status: DISCONTINUED | OUTPATIENT
Start: 2024-05-10 | End: 2024-05-12 | Stop reason: HOSPADM

## 2024-05-10 RX ORDER — OXYCODONE HYDROCHLORIDE 5 MG/1
5 TABLET ORAL EVERY 4 HOURS PRN
Status: DISCONTINUED | OUTPATIENT
Start: 2024-05-10 | End: 2024-05-12 | Stop reason: HOSPADM

## 2024-05-10 RX ORDER — SODIUM CHLORIDE 0.9 % (FLUSH) 0.9 %
5-40 SYRINGE (ML) INJECTION EVERY 12 HOURS SCHEDULED
Status: DISCONTINUED | OUTPATIENT
Start: 2024-05-10 | End: 2024-05-12 | Stop reason: HOSPADM

## 2024-05-10 RX ORDER — DIPHENHYDRAMINE HYDROCHLORIDE 50 MG/ML
12.5 INJECTION INTRAMUSCULAR; INTRAVENOUS
Status: DISCONTINUED | OUTPATIENT
Start: 2024-05-10 | End: 2024-05-10 | Stop reason: HOSPADM

## 2024-05-10 RX ORDER — ROCURONIUM BROMIDE 10 MG/ML
INJECTION, SOLUTION INTRAVENOUS PRN
Status: DISCONTINUED | OUTPATIENT
Start: 2024-05-10 | End: 2024-05-10 | Stop reason: SDUPTHER

## 2024-05-10 RX ORDER — SODIUM CHLORIDE, SODIUM LACTATE, POTASSIUM CHLORIDE, CALCIUM CHLORIDE 600; 310; 30; 20 MG/100ML; MG/100ML; MG/100ML; MG/100ML
INJECTION, SOLUTION INTRAVENOUS ONCE
Status: DISCONTINUED | OUTPATIENT
Start: 2024-05-10 | End: 2024-05-10 | Stop reason: HOSPADM

## 2024-05-10 RX ORDER — OXYCODONE HYDROCHLORIDE 5 MG/1
5 TABLET ORAL
Status: DISCONTINUED | OUTPATIENT
Start: 2024-05-10 | End: 2024-05-10 | Stop reason: HOSPADM

## 2024-05-10 RX ORDER — POLYETHYLENE GLYCOL 3350 17 G/17G
17 POWDER, FOR SOLUTION ORAL DAILY
Status: DISCONTINUED | OUTPATIENT
Start: 2024-05-10 | End: 2024-05-12 | Stop reason: HOSPADM

## 2024-05-10 RX ORDER — ONDANSETRON 2 MG/ML
4 INJECTION INTRAMUSCULAR; INTRAVENOUS
Status: DISCONTINUED | OUTPATIENT
Start: 2024-05-10 | End: 2024-05-10 | Stop reason: HOSPADM

## 2024-05-10 RX ORDER — LIDOCAINE HYDROCHLORIDE 20 MG/ML
INJECTION, SOLUTION INTRAVENOUS PRN
Status: DISCONTINUED | OUTPATIENT
Start: 2024-05-10 | End: 2024-05-10 | Stop reason: SDUPTHER

## 2024-05-10 RX ORDER — SODIUM CHLORIDE 0.9 % (FLUSH) 0.9 %
5-40 SYRINGE (ML) INJECTION PRN
Status: DISCONTINUED | OUTPATIENT
Start: 2024-05-10 | End: 2024-05-10 | Stop reason: HOSPADM

## 2024-05-10 RX ORDER — MIDAZOLAM HYDROCHLORIDE 1 MG/ML
INJECTION INTRAMUSCULAR; INTRAVENOUS PRN
Status: DISCONTINUED | OUTPATIENT
Start: 2024-05-10 | End: 2024-05-10 | Stop reason: SDUPTHER

## 2024-05-10 RX ORDER — SODIUM CHLORIDE 9 MG/ML
INJECTION, SOLUTION INTRAVENOUS PRN
Status: DISCONTINUED | OUTPATIENT
Start: 2024-05-10 | End: 2024-05-10 | Stop reason: HOSPADM

## 2024-05-10 RX ORDER — SODIUM CHLORIDE, SODIUM LACTATE, POTASSIUM CHLORIDE, CALCIUM CHLORIDE 600; 310; 30; 20 MG/100ML; MG/100ML; MG/100ML; MG/100ML
INJECTION, SOLUTION INTRAVENOUS CONTINUOUS
Status: DISPENSED | OUTPATIENT
Start: 2024-05-10 | End: 2024-05-10

## 2024-05-10 RX ORDER — SODIUM CHLORIDE, SODIUM LACTATE, POTASSIUM CHLORIDE, CALCIUM CHLORIDE 600; 310; 30; 20 MG/100ML; MG/100ML; MG/100ML; MG/100ML
INJECTION, SOLUTION INTRAVENOUS CONTINUOUS
Status: DISCONTINUED | OUTPATIENT
Start: 2024-05-10 | End: 2024-05-10 | Stop reason: HOSPADM

## 2024-05-10 RX ORDER — NALOXONE HYDROCHLORIDE 0.4 MG/ML
INJECTION, SOLUTION INTRAMUSCULAR; INTRAVENOUS; SUBCUTANEOUS PRN
Status: DISCONTINUED | OUTPATIENT
Start: 2024-05-10 | End: 2024-05-10 | Stop reason: HOSPADM

## 2024-05-10 RX ORDER — SODIUM CHLORIDE 0.9 % (FLUSH) 0.9 %
5-40 SYRINGE (ML) INJECTION EVERY 12 HOURS SCHEDULED
Status: DISCONTINUED | OUTPATIENT
Start: 2024-05-10 | End: 2024-05-10 | Stop reason: HOSPADM

## 2024-05-10 RX ORDER — LABETALOL HYDROCHLORIDE 5 MG/ML
10 INJECTION, SOLUTION INTRAVENOUS
Status: DISCONTINUED | OUTPATIENT
Start: 2024-05-10 | End: 2024-05-10 | Stop reason: HOSPADM

## 2024-05-10 RX ORDER — FENTANYL CITRATE 50 UG/ML
INJECTION, SOLUTION INTRAMUSCULAR; INTRAVENOUS PRN
Status: DISCONTINUED | OUTPATIENT
Start: 2024-05-10 | End: 2024-05-10 | Stop reason: SDUPTHER

## 2024-05-10 RX ORDER — ACETAMINOPHEN 325 MG/1
650 TABLET ORAL EVERY 6 HOURS
Status: DISCONTINUED | OUTPATIENT
Start: 2024-05-10 | End: 2024-05-12 | Stop reason: HOSPADM

## 2024-05-10 RX ORDER — IBUPROFEN 400 MG/1
600 TABLET ORAL EVERY 8 HOURS
Status: DISCONTINUED | OUTPATIENT
Start: 2024-05-10 | End: 2024-05-12 | Stop reason: HOSPADM

## 2024-05-10 RX ORDER — ONDANSETRON 2 MG/ML
4 INJECTION INTRAMUSCULAR; INTRAVENOUS EVERY 6 HOURS PRN
Status: DISCONTINUED | OUTPATIENT
Start: 2024-05-10 | End: 2024-05-12 | Stop reason: HOSPADM

## 2024-05-10 RX ORDER — SODIUM CHLORIDE 0.9 % (FLUSH) 0.9 %
5-40 SYRINGE (ML) INJECTION PRN
Status: DISCONTINUED | OUTPATIENT
Start: 2024-05-10 | End: 2024-05-12 | Stop reason: HOSPADM

## 2024-05-10 RX ORDER — ENOXAPARIN SODIUM 100 MG/ML
40 INJECTION SUBCUTANEOUS DAILY
Status: DISCONTINUED | OUTPATIENT
Start: 2024-05-11 | End: 2024-05-12 | Stop reason: HOSPADM

## 2024-05-10 RX ORDER — FENTANYL CITRATE 50 UG/ML
25 INJECTION, SOLUTION INTRAMUSCULAR; INTRAVENOUS EVERY 5 MIN PRN
Status: DISCONTINUED | OUTPATIENT
Start: 2024-05-10 | End: 2024-05-10 | Stop reason: HOSPADM

## 2024-05-10 RX ORDER — PROPOFOL 10 MG/ML
INJECTION, EMULSION INTRAVENOUS PRN
Status: DISCONTINUED | OUTPATIENT
Start: 2024-05-10 | End: 2024-05-10 | Stop reason: SDUPTHER

## 2024-05-10 RX ORDER — ONDANSETRON 4 MG/1
4 TABLET, ORALLY DISINTEGRATING ORAL EVERY 8 HOURS PRN
Status: DISCONTINUED | OUTPATIENT
Start: 2024-05-10 | End: 2024-05-12 | Stop reason: HOSPADM

## 2024-05-10 RX ORDER — HYDRALAZINE HYDROCHLORIDE 20 MG/ML
10 INJECTION INTRAMUSCULAR; INTRAVENOUS
Status: DISCONTINUED | OUTPATIENT
Start: 2024-05-10 | End: 2024-05-10 | Stop reason: HOSPADM

## 2024-05-10 RX ORDER — DEXAMETHASONE SODIUM PHOSPHATE 4 MG/ML
INJECTION, SOLUTION INTRA-ARTICULAR; INTRALESIONAL; INTRAMUSCULAR; INTRAVENOUS; SOFT TISSUE PRN
Status: DISCONTINUED | OUTPATIENT
Start: 2024-05-10 | End: 2024-05-10 | Stop reason: SDUPTHER

## 2024-05-10 RX ORDER — ONDANSETRON 2 MG/ML
INJECTION INTRAMUSCULAR; INTRAVENOUS PRN
Status: DISCONTINUED | OUTPATIENT
Start: 2024-05-10 | End: 2024-05-10 | Stop reason: SDUPTHER

## 2024-05-10 RX ORDER — DROPERIDOL 2.5 MG/ML
0.62 INJECTION, SOLUTION INTRAMUSCULAR; INTRAVENOUS EVERY 10 MIN PRN
Status: DISCONTINUED | OUTPATIENT
Start: 2024-05-10 | End: 2024-05-10 | Stop reason: HOSPADM

## 2024-05-10 RX ADMIN — LIDOCAINE HYDROCHLORIDE 100 MG: 20 INJECTION, SOLUTION INTRAVENOUS at 12:40

## 2024-05-10 RX ADMIN — FENTANYL CITRATE 50 MCG: 50 INJECTION, SOLUTION INTRAMUSCULAR; INTRAVENOUS at 12:45

## 2024-05-10 RX ADMIN — DEXAMETHASONE SODIUM PHOSPHATE 4 MG: 4 INJECTION, SOLUTION INTRAMUSCULAR; INTRAVENOUS at 12:49

## 2024-05-10 RX ADMIN — HYDROMORPHONE HYDROCHLORIDE 0.5 MG: 1 INJECTION, SOLUTION INTRAMUSCULAR; INTRAVENOUS; SUBCUTANEOUS at 17:09

## 2024-05-10 RX ADMIN — PROPOFOL 200 MG: 10 INJECTION, EMULSION INTRAVENOUS at 12:40

## 2024-05-10 RX ADMIN — SODIUM CHLORIDE, POTASSIUM CHLORIDE, SODIUM LACTATE AND CALCIUM CHLORIDE: 600; 310; 30; 20 INJECTION, SOLUTION INTRAVENOUS at 17:11

## 2024-05-10 RX ADMIN — SODIUM CHLORIDE, POTASSIUM CHLORIDE, SODIUM LACTATE AND CALCIUM CHLORIDE: 600; 310; 30; 20 INJECTION, SOLUTION INTRAVENOUS at 11:16

## 2024-05-10 RX ADMIN — CEFAZOLIN 2000 MG: 2 INJECTION, POWDER, FOR SOLUTION INTRAMUSCULAR; INTRAVENOUS at 12:43

## 2024-05-10 RX ADMIN — DEXMEDETOMIDINE 8 MCG: 100 INJECTION, SOLUTION INTRAVENOUS at 13:08

## 2024-05-10 RX ADMIN — SUGAMMADEX 200 MG: 100 INJECTION, SOLUTION INTRAVENOUS at 14:31

## 2024-05-10 RX ADMIN — OXYCODONE HYDROCHLORIDE 5 MG: 5 TABLET ORAL at 19:38

## 2024-05-10 RX ADMIN — FENTANYL CITRATE 50 MCG: 50 INJECTION, SOLUTION INTRAMUSCULAR; INTRAVENOUS at 13:45

## 2024-05-10 RX ADMIN — DEXMEDETOMIDINE 4 MCG: 100 INJECTION, SOLUTION INTRAVENOUS at 13:39

## 2024-05-10 RX ADMIN — DEXMEDETOMIDINE 8 MCG: 100 INJECTION, SOLUTION INTRAVENOUS at 13:19

## 2024-05-10 RX ADMIN — ONDANSETRON 4 MG: 2 INJECTION INTRAMUSCULAR; INTRAVENOUS at 14:20

## 2024-05-10 RX ADMIN — ACETAMINOPHEN 650 MG: 325 TABLET ORAL at 17:10

## 2024-05-10 RX ADMIN — FENTANYL CITRATE 50 MCG: 50 INJECTION, SOLUTION INTRAMUSCULAR; INTRAVENOUS at 12:37

## 2024-05-10 RX ADMIN — ROCURONIUM BROMIDE 50 MG: 10 INJECTION INTRAVENOUS at 12:40

## 2024-05-10 RX ADMIN — FENTANYL CITRATE 50 MCG: 50 INJECTION, SOLUTION INTRAMUSCULAR; INTRAVENOUS at 14:38

## 2024-05-10 RX ADMIN — MIDAZOLAM 2 MG: 1 INJECTION INTRAMUSCULAR; INTRAVENOUS at 12:36

## 2024-05-10 ASSESSMENT — PAIN - FUNCTIONAL ASSESSMENT
PAIN_FUNCTIONAL_ASSESSMENT: 0-10
PAIN_FUNCTIONAL_ASSESSMENT: ACTIVITIES ARE NOT PREVENTED
PAIN_FUNCTIONAL_ASSESSMENT: 0-10
PAIN_FUNCTIONAL_ASSESSMENT: ACTIVITIES ARE NOT PREVENTED
PAIN_FUNCTIONAL_ASSESSMENT: ACTIVITIES ARE NOT PREVENTED

## 2024-05-10 ASSESSMENT — PAIN SCALES - GENERAL
PAINLEVEL_OUTOF10: 0
PAINLEVEL_OUTOF10: 5
PAINLEVEL_OUTOF10: 6
PAINLEVEL_OUTOF10: 7
PAINLEVEL_OUTOF10: 0
PAINLEVEL_OUTOF10: 5

## 2024-05-10 ASSESSMENT — PAIN DESCRIPTION - LOCATION
LOCATION: NECK;THROAT
LOCATION: HEAD
LOCATION: NECK

## 2024-05-10 ASSESSMENT — PAIN DESCRIPTION - DESCRIPTORS
DESCRIPTORS: ACHING

## 2024-05-10 ASSESSMENT — PAIN SCALES - WONG BAKER: WONGBAKER_NUMERICALRESPONSE: NO HURT

## 2024-05-10 ASSESSMENT — PAIN DESCRIPTION - ORIENTATION
ORIENTATION: LOWER
ORIENTATION: RIGHT;LEFT
ORIENTATION: MID

## 2024-05-10 NOTE — PROGRESS NOTES
4 Eyes Skin Assessment     NAME:  Ene Fernández  YOB: 1990  MEDICAL RECORD NUMBER:  [id number]    The patient is being assessed for  Admission    I agree that at least one RN has performed a thorough Head to Toe Skin Assessment on the patient. ALL assessment sites listed below have been assessed.      Areas assessed by both nurses:    Head, Face, Ears, Shoulders, Back, Chest, Arms, Elbows, Hands, Sacrum. Buttock, Coccyx, Ischium, Legs. Feet and Heels, and Under Medical Devices         Does the Patient have a Wound? Yes wound(s) were present on assessment. LDA wound assessment was Initiated and completed by RN      Surgical incision on neck.    Lico Prevention initiated by RN: No  Wound Care Orders initiated by RN: No    Pressure Injury (Stage 3,4, Unstageable, DTI, NWPT, and Complex wounds) if present, place Wound referral order by RN under : No    New Ostomies, if present place, Ostomy referral order under : No     Nurse 1 eSignature: Electronically signed by Morelia Ribeiro RN on 5/10/24 at 6:38 PM EDT    **SHARE this note so that the co-signing nurse can place an eSignature**    Nurse 2 eSignature: Electronically signed by Milly Baumann RN on 5/10/24 at 7:10 PM EDT

## 2024-05-10 NOTE — PROGRESS NOTES
1452: Patient arrived in PACU from the OR s/p right thyroid lobectomy. Received report from OR nurse and Teresita ANGEL. Patient attached to monitor and all alarms are on.   1515: Updated patient's family member in the surgical waiting area.   1525: Called report to nurse Morelia.  1532: RN used  services used to assess patient and address needs.   1559: Patient transport bedside. Patient awake and alert, able to follow commands. Incision on neck is clean, dry and intact. YARELIS drain clean dry and intact, drain to bulb suction.   1600: Called patients family member to notify them patient is heading to room.

## 2024-05-10 NOTE — INTERVAL H&P NOTE
Update History & Physical    The patient's History and Physical of April 25, The plan was reviewed with the patient and I examined the patient. There was no change. The surgical site was confirmed by the patient and me.     Plan: The risks, benefits, expected outcome, and alternative to the recommended procedure have been discussed with the patient. Patient understands and wants to proceed with the procedure.     Electronically signed by Delio Heredia MD on 5/10/2024 at 11:53 AM

## 2024-05-10 NOTE — BRIEF OP NOTE
Brief Postoperative Note      Patient: Ene Fernández  YOB: 1990  MRN: [id number]    Date of Procedure: 5/10/2024    Pre-Op Diagnosis Codes:     * Thyroid nodule [E04.1]    Post-Op Diagnosis: Same       Procedure(s):  RIGHT THYROID LOBECTOMY DRAIN PLACEMENT    Surgeon(s):  Feliciano Cantu MD    Assistant:  Resident: Delio Heredia MD    Anesthesia: General    Estimated Blood Loss (mL): less than 50     Complications: None    Specimens:   ID Type Source Tests Collected by Time Destination   A : right inferior thyroid nodule Tissue Tissue SURGICAL PATHOLOGY Feliciano Cantu MD 5/10/2024 1349    B : right thyroid suture marks superior Tissue Tissue SURGICAL PATHOLOGY Feliciano Cantu MD 5/10/2024 1328        Implants:  * No implants in log *      Drains:   Closed/Suction Drain Right;Anterior Neck (Active)       Findings:  Infection Present At Time Of Surgery (PATOS) (choose all levels that have infection present):  No infection present    Electronically signed by Delio Heredia MD on 5/10/2024 at 2:52 PM   Physical Therapy Visit    Visit Type: Daily Treatment Note  Visit: 3  Referring Provider: Lloyd March MD  Medical Diagnosis (from order): Diagnosis Information    Diagnosis  V54.19 (ICD-9-CM) - S82.841D (ICD-10-CM) - Closed bimalleolar fracture of right ankle with routine healing         SUBJECTIVE                                                                                                               Patient reports that she has tried a few stairs with the right lower extremity leading. Continues to have anterior joint stiffness.      OBJECTIVE                                                                                                                                       Treatment     Therapeutic Exercise  Supine passive range of motion to right ankle in all directions x 10    Seated ankle dorsiflexion slides x 10  Seated heel toe raises x 10  Seated toe scrunches x 10  Seated fitter board AP/ML/circles x 10 (use towel at home)    Standing fitter board AP and ML x 10 with holds  Standing hip ext and abduction x 10  Squats x 10    Recumbent bike x 3 minutes    Manual Therapy   Supine soft tissue/scar massage to right ankle complex as tolerated. Skin intact.    Skilled input: verbal instruction/cues, tactile instruction/cues and posture correction    Writer verbally educated and received verbal consent for hand placement, positioning of patient, and techniques to be performed today from patient for therapist position for techniques and hand placement and palpation for techniques as described above and how they are pertinent to the patient's plan of care.  Home Exercise Program  *above indicates provided as part of home exercise program    Access Code: [email or web address]  URL: [email or web address]  Date: 07/12/2023  Prepared by: Rhea Hartley    Exercises  - Supine Ankle Pumps  - 2 x daily - 7 x weekly - 1 sets - 10 reps  - Supine Ankle Circles  - 2 x daily - 7 x weekly - 1 sets -  10 reps  - Seated Ankle Dorsiflexion Stretch  - 2 x daily - 7 x weekly - 1 sets - 10 reps  - Seated Heel Toe Raises  - 2 x daily - 7 x weekly - 1 sets - 10 reps  - Seated Toe Towel Scrunches  - 2 x daily - 7 x weekly - 1 sets - 10 reps  - Seated Ankle Inversion Eversion AROM  - 2 x daily - 7 x weekly - 1 sets - 10 reps  - Ankle Plantarflexion Dorsiflexion AROM on Rocker Board  - 2 x daily - 7 x weekly - 1 sets - 10 reps  - Ankle Supination Pronation AROM on Rocker Board  - 2 x daily - 7 x weekly - 1 sets - 10 reps      ASSESSMENT                                                                                                            Patient reports now driving with no concerns. Decreased stiffness and tissue restriction following soft tissue massage. PT reviewed home exercise program and progressed range of motion with standing exercises.  Education:   - Results of above outlined education: Verbalizes understanding and Demonstrates understanding    PLAN                                                                                                                           Suggestions for next session as indicated: Progress per plan of care       Therapy procedure time and total treatment time can be found documented on the Time Entry flowsheet

## 2024-05-11 LAB
ANION GAP SERPL CALCULATED.3IONS-SCNC: 11 MMOL/L (ref 7–16)
BASOPHILS ABSOLUTE: 0 K/CU MM
BASOPHILS RELATIVE PERCENT: 0.1 % (ref 0–1)
BUN SERPL-MCNC: 12 MG/DL (ref 6–23)
CALCIUM SERPL-MCNC: 8.6 MG/DL (ref 8.3–10.6)
CHLORIDE BLD-SCNC: 102 MMOL/L (ref 99–110)
CO2: 24 MMOL/L (ref 21–32)
CREAT SERPL-MCNC: 0.7 MG/DL (ref 0.6–1.1)
DIFFERENTIAL TYPE: ABNORMAL
EOSINOPHILS ABSOLUTE: 0 K/CU MM
EOSINOPHILS RELATIVE PERCENT: 0 % (ref 0–3)
GFR, ESTIMATED: >90 ML/MIN/1.73M2
GLUCOSE SERPL-MCNC: 107 MG/DL (ref 70–99)
HCT VFR BLD CALC: 36.6 % (ref 37–47)
HEMOGLOBIN: 12.4 GM/DL (ref 12.5–16)
IMMATURE NEUTROPHIL %: 0.1 % (ref 0–0.43)
LYMPHOCYTES ABSOLUTE: 1.4 K/CU MM
LYMPHOCYTES RELATIVE PERCENT: 17.6 % (ref 24–44)
MCH RBC QN AUTO: 29.7 PG (ref 27–31)
MCHC RBC AUTO-ENTMCNC: 33.9 % (ref 32–36)
MCV RBC AUTO: 87.8 FL (ref 78–100)
MONOCYTES ABSOLUTE: 0.3 K/CU MM
MONOCYTES RELATIVE PERCENT: 4 % (ref 0–4)
NEUTROPHILS ABSOLUTE: 6.2 K/CU MM
NEUTROPHILS RELATIVE PERCENT: 78.2 % (ref 36–66)
NUCLEATED RBC %: 0 %
PDW BLD-RTO: 13.5 % (ref 11.7–14.9)
PLATELET # BLD: 308 K/CU MM (ref 140–440)
PMV BLD AUTO: 8.9 FL (ref 7.5–11.1)
POTASSIUM SERPL-SCNC: 4 MMOL/L (ref 3.5–5.1)
RBC # BLD: 4.17 M/CU MM (ref 4.2–5.4)
SODIUM BLD-SCNC: 137 MMOL/L (ref 135–145)
TOTAL IMMATURE NEUTOROPHIL: 0.01 K/CU MM
TOTAL NUCLEATED RBC: 0 K/CU MM
WBC # BLD: 8 K/CU MM (ref 4–10.5)

## 2024-05-11 PROCEDURE — 94761 N-INVAS EAR/PLS OXIMETRY MLT: CPT

## 2024-05-11 PROCEDURE — 6360000002 HC RX W HCPCS: Performed by: SURGERY

## 2024-05-11 PROCEDURE — APPNB15 APP NON BILLABLE TIME 0-15 MINS: Performed by: NURSE PRACTITIONER

## 2024-05-11 PROCEDURE — 96375 TX/PRO/DX INJ NEW DRUG ADDON: CPT

## 2024-05-11 PROCEDURE — 85025 COMPLETE CBC W/AUTO DIFF WBC: CPT

## 2024-05-11 PROCEDURE — 94664 DEMO&/EVAL PT USE INHALER: CPT

## 2024-05-11 PROCEDURE — G0378 HOSPITAL OBSERVATION PER HR: HCPCS

## 2024-05-11 PROCEDURE — 6370000000 HC RX 637 (ALT 250 FOR IP): Performed by: STUDENT IN AN ORGANIZED HEALTH CARE EDUCATION/TRAINING PROGRAM

## 2024-05-11 PROCEDURE — 6360000002 HC RX W HCPCS: Performed by: STUDENT IN AN ORGANIZED HEALTH CARE EDUCATION/TRAINING PROGRAM

## 2024-05-11 PROCEDURE — 36415 COLL VENOUS BLD VENIPUNCTURE: CPT

## 2024-05-11 PROCEDURE — 96374 THER/PROPH/DIAG INJ IV PUSH: CPT

## 2024-05-11 PROCEDURE — 1200000000 HC SEMI PRIVATE

## 2024-05-11 PROCEDURE — 99024 POSTOP FOLLOW-UP VISIT: CPT | Performed by: NURSE PRACTITIONER

## 2024-05-11 PROCEDURE — 2580000003 HC RX 258: Performed by: STUDENT IN AN ORGANIZED HEALTH CARE EDUCATION/TRAINING PROGRAM

## 2024-05-11 PROCEDURE — 94150 VITAL CAPACITY TEST: CPT

## 2024-05-11 PROCEDURE — 96372 THER/PROPH/DIAG INJ SC/IM: CPT

## 2024-05-11 PROCEDURE — 80048 BASIC METABOLIC PNL TOTAL CA: CPT

## 2024-05-11 RX ADMIN — ACETAMINOPHEN 650 MG: 325 TABLET ORAL at 17:55

## 2024-05-11 RX ADMIN — IBUPROFEN 600 MG: 400 TABLET, FILM COATED ORAL at 21:42

## 2024-05-11 RX ADMIN — IBUPROFEN 600 MG: 400 TABLET, FILM COATED ORAL at 13:50

## 2024-05-11 RX ADMIN — OXYCODONE HYDROCHLORIDE 5 MG: 5 TABLET ORAL at 04:34

## 2024-05-11 RX ADMIN — ONDANSETRON 4 MG: 2 INJECTION INTRAMUSCULAR; INTRAVENOUS at 00:25

## 2024-05-11 RX ADMIN — ENOXAPARIN SODIUM 40 MG: 100 INJECTION SUBCUTANEOUS at 10:01

## 2024-05-11 RX ADMIN — SODIUM CHLORIDE, PRESERVATIVE FREE 10 ML: 5 INJECTION INTRAVENOUS at 21:42

## 2024-05-11 RX ADMIN — POLYETHYLENE GLYCOL 3350 17 G: 17 POWDER, FOR SOLUTION ORAL at 10:02

## 2024-05-11 RX ADMIN — ACETAMINOPHEN 650 MG: 325 TABLET ORAL at 10:01

## 2024-05-11 RX ADMIN — HYDROMORPHONE HYDROCHLORIDE 0.5 MG: 1 INJECTION, SOLUTION INTRAMUSCULAR; INTRAVENOUS; SUBCUTANEOUS at 00:25

## 2024-05-11 RX ADMIN — SODIUM CHLORIDE, PRESERVATIVE FREE 10 ML: 5 INJECTION INTRAVENOUS at 10:03

## 2024-05-11 RX ADMIN — OXYCODONE HYDROCHLORIDE 10 MG: 10 TABLET ORAL at 13:59

## 2024-05-11 ASSESSMENT — PAIN SCALES - WONG BAKER
WONGBAKER_NUMERICALRESPONSE: HURTS EVEN MORE
WONGBAKER_NUMERICALRESPONSE: NO HURT
WONGBAKER_NUMERICALRESPONSE: HURTS EVEN MORE
WONGBAKER_NUMERICALRESPONSE: HURTS EVEN MORE
WONGBAKER_NUMERICALRESPONSE: HURTS A LITTLE BIT

## 2024-05-11 ASSESSMENT — PAIN DESCRIPTION - DESCRIPTORS
DESCRIPTORS: ACHING

## 2024-05-11 ASSESSMENT — PAIN DESCRIPTION - LOCATION
LOCATION: NECK
LOCATION: NECK;THROAT
LOCATION: NECK
LOCATION: NECK

## 2024-05-11 ASSESSMENT — PAIN DESCRIPTION - ORIENTATION
ORIENTATION: MID;ANTERIOR
ORIENTATION: MID

## 2024-05-11 ASSESSMENT — PAIN SCALES - GENERAL
PAINLEVEL_OUTOF10: 7
PAINLEVEL_OUTOF10: 6
PAINLEVEL_OUTOF10: 7
PAINLEVEL_OUTOF10: 7
PAINLEVEL_OUTOF10: 0

## 2024-05-11 ASSESSMENT — PAIN - FUNCTIONAL ASSESSMENT
PAIN_FUNCTIONAL_ASSESSMENT: PREVENTS OR INTERFERES SOME ACTIVE ACTIVITIES AND ADLS
PAIN_FUNCTIONAL_ASSESSMENT: ACTIVITIES ARE NOT PREVENTED
PAIN_FUNCTIONAL_ASSESSMENT: ACTIVITIES ARE NOT PREVENTED
PAIN_FUNCTIONAL_ASSESSMENT: PREVENTS OR INTERFERES SOME ACTIVE ACTIVITIES AND ADLS

## 2024-05-11 ASSESSMENT — PAIN DESCRIPTION - FREQUENCY: FREQUENCY: INTERMITTENT

## 2024-05-11 ASSESSMENT — PAIN DESCRIPTION - PAIN TYPE: TYPE: SURGICAL PAIN

## 2024-05-11 ASSESSMENT — PAIN DESCRIPTION - ONSET: ONSET: GRADUAL

## 2024-05-11 NOTE — PROGRESS NOTES
GENERAL SURGERY INPATIENT PROGRESS NOTE  [facility]    PATIENT: Ene Fernández, 33 y.o., female, MRN: [id number]    Hospital Day:  LOS: 1 day , Post-Op Day: 1 Day Post-Op                 Subjective:    Patient states she has some pain with eating but able to swallow solid foods.  Drain in place    Objective:    Vitals: BP (!) 144/99   Pulse (!) 108   Temp 97.3 °F (36.3 °C) (Oral)   Resp 16   Ht 1.575 m (5' 2.01\")   Wt 84.9 kg (187 lb 2.7 oz)   LMP 04/29/2024 (Approximate)   SpO2 94%   BMI 34.23 kg/m²     I/O: 05/09 1901 - 05/11 0700  In: -   Out: 50 [Drains:30]    Physical Exam:  General Appearance:   Alert, cooperative, no distress    Head:   Normocephalic, atraumatic.  Incision intact with surgical glue.  Drain in place with minimal ss output   Lungs:    Equal chest rise, respirations unlabored   Heart:   Regular rate and rhythm    Abdomen:    Soft, non-tender, no rebound or guarding    Extremities:  No cyanosis or edema    Neurologic:  Nonfocal, grossly intact        Labs/Imaging Results:   Recent Results (from the past 24 hour(s))   Calcium    Collection Time: 05/10/24  7:49 PM   Result Value Ref Range    Calcium 8.8 8.3 - 10.6 MG/DL   CBC auto differential    Collection Time: 05/11/24  1:23 AM   Result Value Ref Range    WBC 8.0 4.0 - 10.5 K/CU MM    RBC 4.17 (L) 4.2 - 5.4 M/CU MM    Hemoglobin 12.4 (L) 12.5 - 16.0 GM/DL    Hematocrit 36.6 (L) 37 - 47 %    MCV 87.8 78 - 100 FL    MCH 29.7 27 - 31 PG    MCHC 33.9 32.0 - 36.0 %    RDW 13.5 11.7 - 14.9 %    Platelets 308 140 - 440 K/CU MM    MPV 8.9 7.5 - 11.1 FL    Differential Type AUTOMATED DIFFERENTIAL     Neutrophils % 78.2 (H) 36 - 66 %    Lymphocytes % 17.6 (L) 24 - 44 %    Monocytes % 4.0 0 - 4 %    Eosinophils % 0.0 0 - 3 %    Basophils % 0.1 0 - 1 %    Neutrophils Absolute 6.2 K/CU MM    Lymphocytes Absolute 1.4 K/CU MM    Monocytes Absolute 0.3 K/CU MM    Eosinophils Absolute 0.0 K/CU MM    Basophils Absolute 0.0 K/CU MM    Nucleated

## 2024-05-12 VITALS
OXYGEN SATURATION: 95 % | TEMPERATURE: 97.2 F | SYSTOLIC BLOOD PRESSURE: 141 MMHG | DIASTOLIC BLOOD PRESSURE: 99 MMHG | HEART RATE: 89 BPM | HEIGHT: 62 IN | RESPIRATION RATE: 18 BRPM | WEIGHT: 187.17 LBS | BODY MASS INDEX: 34.44 KG/M2

## 2024-05-12 PROCEDURE — APPNB15 APP NON BILLABLE TIME 0-15 MINS: Performed by: NURSE PRACTITIONER

## 2024-05-12 PROCEDURE — 6370000000 HC RX 637 (ALT 250 FOR IP): Performed by: STUDENT IN AN ORGANIZED HEALTH CARE EDUCATION/TRAINING PROGRAM

## 2024-05-12 PROCEDURE — 94761 N-INVAS EAR/PLS OXIMETRY MLT: CPT

## 2024-05-12 PROCEDURE — G0378 HOSPITAL OBSERVATION PER HR: HCPCS

## 2024-05-12 PROCEDURE — 99024 POSTOP FOLLOW-UP VISIT: CPT | Performed by: NURSE PRACTITIONER

## 2024-05-12 RX ORDER — HYDROCODONE BITARTRATE AND ACETAMINOPHEN 5; 325 MG/1; MG/1
1 TABLET ORAL EVERY 4 HOURS PRN
Qty: 12 TABLET | Refills: 0 | Status: SHIPPED | OUTPATIENT
Start: 2024-05-12 | End: 2024-05-19

## 2024-05-12 RX ADMIN — ACETAMINOPHEN 650 MG: 325 TABLET ORAL at 00:12

## 2024-05-12 RX ADMIN — IBUPROFEN 600 MG: 400 TABLET, FILM COATED ORAL at 05:45

## 2024-05-12 RX ADMIN — POLYETHYLENE GLYCOL 3350 17 G: 17 POWDER, FOR SOLUTION ORAL at 10:38

## 2024-05-12 RX ADMIN — ACETAMINOPHEN 650 MG: 325 TABLET ORAL at 10:38

## 2024-05-12 ASSESSMENT — PAIN DESCRIPTION - ORIENTATION
ORIENTATION: ANTERIOR
ORIENTATION: ANTERIOR

## 2024-05-12 ASSESSMENT — PAIN SCALES - WONG BAKER
WONGBAKER_NUMERICALRESPONSE: HURTS EVEN MORE

## 2024-05-12 ASSESSMENT — PAIN DESCRIPTION - LOCATION
LOCATION: THROAT
LOCATION: NECK;THROAT

## 2024-05-12 ASSESSMENT — PAIN SCALES - GENERAL: PAINLEVEL_OUTOF10: 3

## 2024-05-12 ASSESSMENT — PAIN DESCRIPTION - DESCRIPTORS
DESCRIPTORS: SORE
DESCRIPTORS: ACHING

## 2024-05-12 ASSESSMENT — PAIN - FUNCTIONAL ASSESSMENT
PAIN_FUNCTIONAL_ASSESSMENT: PREVENTS OR INTERFERES SOME ACTIVE ACTIVITIES AND ADLS
PAIN_FUNCTIONAL_ASSESSMENT: ACTIVITIES ARE NOT PREVENTED

## 2024-05-12 NOTE — PROGRESS NOTES
Occupational Therapy    Per therapy triage process, the OT referral has been discharged. Per discussion w/ RN and observation of pt, pt showering independently and does not present w/ any skilled therapy needs. Please re-order in future if pt requires skilled therapy services.     Lisa Mendiola OTR/L [id number]  10:26 AM,5/12/2024

## 2024-05-12 NOTE — DISCHARGE INSTRUCTIONS
Postoperative Instructions:  The following instructions will help you care for yourself or be cared for upon your return home.  These are guidelines for your care after surgery only.    Anesthesia Precautions & Expectations:  After anesthesia, rest for 24 hours.  Do not drive, drink alcoholic beverages or make any important decisions during this time.    Anesthesia may cause a sore throat, jaw discomfort or muscle aches.  These symptoms can last for one or two days.   You may feel tired for several days.    Lifting:    No lifting more than 10 lbs for 2 weeks.  May increase lifting amount by 10 lbs per week after 2 weeks.    Diet:    After your anesthesia has worn off, increase your diet slowly to a regular diet  Avoid high sugar and fatty foods  Drink plenty of fluids--approx. 48-64 oz a day unless instructed to restrict fluids by your doctor    Medications:  Take your medications as instructed  Do not drive after taking prescription pain medicine.   Pain medications are meant to be taken as needed for postoperative pain. It is normal to have some pain or soreness after surgery. This will usually decrease over a few days. Gradually wean your use of these prescription medications and transition to over the counter medications.  Narcotic pain medications can cause constipation. Take an over-the-counter stool softener such as Colace, MiraLax, or Milk of Magnesia daily according to  instructions while you're taking narcotic pain medications to help prevent constipation.  Eat when taking prescription pain medication because they cause nausea on an empty stomach.  Over-the-counter (OTC) pain medication  Advil/ibuprofen/Motrin, 200 mg tablets: you can take up to 2 tablets every 4-6 hours as needed.  Do not use these if you have stomach problems like ulcers or if you are on blood thinners.  You may also take Tylenol /Acetaminophen: 2 Extra-strength tablets every 4-6 hours as needed.  Caution: Percocet and

## 2024-05-12 NOTE — DISCHARGE SUMMARY
DISCHARGE SUMMARY  [facility] - General Surgery    Patient ID:  Name:Ene Fernández  Date: 2024 10:33 AM   MRN#: [id number] :1990   Admission Date:5/10/2024 Age/Sex:33 y.o. female       Discharge date and time: No discharge date for patient encounter., (expected 2024)    Admitting Physician: Feliciano Cantu MD     Discharge Physician:same    Admission Diagnoses: Thyroid nodule [E04.1]    Discharge Diagnoses: Principal Problem:    Thyroid nodule  Resolved Problems:    * No resolved hospital problems. *       Admission Condition: stable     Discharged Condition: stable    Indication for Admission: Principal Problem:    Thyroid nodule  Resolved Problems:    * No resolved hospital problems. *       Hospital Course:   Ene Fernández is a 33 y.o. female presented for scheduled surgery due to large right thyroid nodule. She underwent right thyroidectomy with drain placement. Postoperatively she did well; then was progressed to tolerating a diet, had adequate pain control, and felt ready for discharge home.    Consults: none    Significant Diagnostic Studies: see chart    Treatments: surgery/procedure: right thyroidectomy    Disposition: Home or Self Care    Patient Instructions:      Medication List        ASK your doctor about these medications      ibuprofen 600 MG tablet  Commonly known as: ADVIL;MOTRIN  Take 1 tablet by mouth 3 times daily as needed for Pain            No discharge procedures on file.  No discharge procedures on file.    For further information regarding this hospitalization, please refer to the patient's medical record.     Electronically signed: ROSE Gardiner CNP 2024 10:33 AM

## 2024-05-13 NOTE — PROGRESS NOTES
05/13/24 1216   Encounter Summary   Encounter Overview/Reason Volunteer Encounter   Service Provided For Patient   Referral/Consult From Volunteer   Support System Family members   Last Encounter  05/12/24  (Volunteer visit by Alexandrea Chester, [facility] ; documented by GRAZYNA Beckwith.)   Complexity of Encounter Low   Begin Time 1030   End Time  1040   Total Time Calculated 10 min   Spiritual/Emotional needs   Type Spiritual Support   Rituals, Rites and Sacraments   Type [employer]   Assessment/Intervention/Outcome   Assessment Calm   Intervention Nurtured Hope   Outcome Comfort   Plan and Referrals   Plan/Referrals Continue to visit, (comment)

## 2024-05-14 NOTE — OP NOTE
Procedure Note    Indications: This patient presents with a palpable nodule on the right side of the thyroid.  Fine needle aspiration cytology revealed consistent with follicular nodular disease (includes adenomatoid nodule, colloid nodule, etc.) The patient now presents for a thyroid lobectomy and possible total thyroidectomy.    Pre-operative Diagnosis: right thyroid nodule    Post-operative Diagnosis:  same    Procedure: Right Thyroidectomy    Surgeon: HERBERT DELGADILLO MD    Estimated Blood Loss:  Minimal           Drains: x1           Total IV Fluids: 500 ml           Specimens: right thyroid               Findings:  The right recurrent laryngeal nerve was identified.  Parathyroid tissue was identified and preserved with a viable blood supply.  A nodule was found within the thyroid lobe, located in the upper pole, lower pole.  This measured approximately 10 cm in diameter and mobile from surrounding structures.     Complications:  None; patient tolerated the procedure well.           Disposition: PACU - hemodynamically stable.           Condition: stable    Procedure Details   The patient was seen in the Holding Room. The risks, benefits, complications, treatment options, and expected outcomes were discussed with the patient. The possibilities of reaction to medication, pulmonary aspiration, bleeding, recurrent infection, finding a normal thyroid, recurrently laryngeal nerve damage, the need for additional procedures, failure to diagnose a condition, and creating a complication requiring transfusion or operation were discussed with the patient. The patient concurred with the proposed plan, giving informed consent.  The site of surgery properly noted/marked. The patient was taken to the Operating Room, identified as Ene Fernández and the procedure verified as thyroid lobectomy. A Time Out was held and the above information confirmed.    The patient was placed supine after induction of a general anesthetic.  The neck

## 2024-05-23 ENCOUNTER — OFFICE VISIT (OUTPATIENT)
Dept: SURGERY | Age: 34
End: 2024-05-23
Payer: COMMERCIAL

## 2024-05-23 VITALS
HEART RATE: 93 BPM | HEIGHT: 62 IN | BODY MASS INDEX: 34.08 KG/M2 | DIASTOLIC BLOOD PRESSURE: 84 MMHG | SYSTOLIC BLOOD PRESSURE: 134 MMHG | OXYGEN SATURATION: 97 % | WEIGHT: 185.2 LBS

## 2024-05-23 DIAGNOSIS — N93.9 VAGINAL BLEEDING: ICD-10-CM

## 2024-05-23 DIAGNOSIS — E04.1 THYROID NODULE: Primary | ICD-10-CM

## 2024-05-23 PROCEDURE — 99024 POSTOP FOLLOW-UP VISIT: CPT | Performed by: SURGERY

## 2024-05-23 PROCEDURE — 36415 COLL VENOUS BLD VENIPUNCTURE: CPT | Performed by: SURGERY

## 2024-05-23 NOTE — PROGRESS NOTES
Chief Complaint   Patient presents with    Post-Op Check     1st P/O - Rt Thyroid Lobectomy w/Drain placement         SUBJECTIVE:  Patient here for post op visit.  She is having menorrhagia   Pain is minimal.  Wounds: minbruising and no discharge.    Past Surgical History:   Procedure Laterality Date    IR BIOPSY THYROID PERC CORE NEEDLE  4/15/2024    IR BIOPSY THYROID PERC CORE NEEDLE 4/15/2024 [facility] SPECIAL PROCEDURES    NO PAST SURGERIES      THYROID LOBECTOMY Right 5/10/2024    RIGHT THYROID LOBECTOMY DRAIN PLACEMENT performed by Feliciano Cantu MD at [facility] OR     No past medical history on file.  Family History   Problem Relation Age of Onset    Asthma Mother      Social History     Socioeconomic History    Marital status: Single     Spouse name: Not on file    Number of children: Not on file    Years of education: Not on file    Highest education level: Not on file   Occupational History    Not on file   Tobacco Use    Smoking status: Never    Smokeless tobacco: Never   Vaping Use    Vaping Use: Never used   Substance and Sexual Activity    Alcohol use: Never    Drug use: Never    Sexual activity: Not on file   Other Topics Concern    Not on file   Social History Narrative    Not on file     Social Determinants of Health     Financial Resource Strain: Not on file   Food Insecurity: Not on file   Transportation Needs: Not on file   Physical Activity: Not on file   Stress: Not on file   Social Connections: Not on file   Intimate Partner Violence: Not on file   Housing Stability: Not on file       OBJECTIVE:   Physical Exam    Wound well healed without signs of active infection. Suture line intact. Abdomen soft, nontender, nondistended.     Path reveals:       Final Pathologic Diagnosis:   A.  Right inferior thyroid node; biopsy:   -       Benign follicular/colloid nodule.      B.  Thyroid, right; lobectomy:   -       Benign multinodular follicular/colloid goiter with   cystic change     ASSESSMENT:  Patient doing

## 2024-05-24 LAB
DEPRECATED RDW RBC AUTO: 14.7 % (ref 12.4–15.4)
HCT VFR BLD AUTO: 35.9 % (ref 36–48)
HGB BLD-MCNC: 12.2 G/DL (ref 12–16)
MCH RBC QN AUTO: 30.1 PG (ref 26–34)
MCHC RBC AUTO-ENTMCNC: 33.9 G/DL (ref 31–36)
MCV RBC AUTO: 88.8 FL (ref 80–100)
PLATELET # BLD AUTO: 374 K/UL (ref 135–450)
PMV BLD AUTO: 7.8 FL (ref 5–10.5)
RBC # BLD AUTO: 4.04 M/UL (ref 4–5.2)
T3FREE SERPL-MCNC: 3.3 PG/ML (ref 2.3–4.2)
T4 SERPL-MCNC: 5.7 UG/DL (ref 4.5–10.9)
TSH SERPL DL<=0.005 MIU/L-ACNC: 4.07 UIU/ML (ref 0.27–4.2)
WBC # BLD AUTO: 6.1 K/UL (ref 4–11)

## 2024-05-28 ENCOUNTER — INITIAL CONSULT (OUTPATIENT)
Dept: OBGYN | Age: 34
End: 2024-05-28
Payer: COMMERCIAL

## 2024-05-28 VITALS
HEIGHT: 62 IN | SYSTOLIC BLOOD PRESSURE: 149 MMHG | DIASTOLIC BLOOD PRESSURE: 105 MMHG | BODY MASS INDEX: 33.9 KG/M2 | WEIGHT: 184.2 LBS | HEART RATE: 106 BPM

## 2024-05-28 DIAGNOSIS — Z90.09 HISTORY OF LOBECTOMY OF THYROID: ICD-10-CM

## 2024-05-28 DIAGNOSIS — I10 PRIMARY HYPERTENSION: ICD-10-CM

## 2024-05-28 DIAGNOSIS — N92.6 IRREGULAR MENSTRUAL CYCLE: Primary | ICD-10-CM

## 2024-05-28 PROCEDURE — 99204 OFFICE O/P NEW MOD 45 MIN: CPT

## 2024-05-28 RX ORDER — ACETAMINOPHEN AND CODEINE PHOSPHATE 120; 12 MG/5ML; MG/5ML
1 SOLUTION ORAL DAILY
Qty: 28 TABLET | Refills: 11 | Status: SHIPPED | OUTPATIENT
Start: 2024-05-28

## 2024-05-28 ASSESSMENT — ENCOUNTER SYMPTOMS
CHEST TIGHTNESS: 0
NAUSEA: 0
DIARRHEA: 0
GASTROINTESTINAL NEGATIVE: 1
SHORTNESS OF BREATH: 0
ABDOMINAL PAIN: 0
VOMITING: 0
RESPIRATORY NEGATIVE: 1
CONSTIPATION: 0

## 2024-05-28 NOTE — PROGRESS NOTES
24    Ene Fernándze  1990    Chief Complaint   Patient presents with    New Patient    Annual Exam     Irregular menses, pt states on days 1,3,4 of cycle is extremely heavy, LMP 24, pt reports spotting 24, Pt denies pain today or throughout the month. Pap  in [facility], BC-None, Not sexually active, Pt had S/P Right Thyroid Lobectomy on 5/10/24. 24 T3, T4 TSH.         Ene Fernández is a 33 y.o. female who presents today for evaluation of vaginal bleeding after R thyroid lobectomy on May 10. States she has been bleeding on and off since May 12. Some days are heavy, some days are light, and some days it stops, but then returns. Bleeding is not associated with any pelvic pain. She has history of irregular cycles, and was told in [facility] that she had PCOS. She cannot gauge how long cycles last because the bleeding fluctuates, much like current bleeding.     Following with gen surg d/t thyroid surgery, they have also referred her to endo.     Past Medical History:   Diagnosis Date    Irregular menses        Past Surgical History:   Procedure Laterality Date    IR BIOPSY THYROID PERC CORE NEEDLE  4/15/2024    IR BIOPSY THYROID PERC CORE NEEDLE 4/15/2024 [facility] SPECIAL PROCEDURES    NO PAST SURGERIES      THYROID LOBECTOMY Right 5/10/2024    RIGHT THYROID LOBECTOMY DRAIN PLACEMENT performed by Feliciano Cantu MD at [facility] OR       Social History     Tobacco Use    Smoking status: Never    Smokeless tobacco: Never   Vaping Use    Vaping Use: Never used   Substance Use Topics    Alcohol use: Never    Drug use: Never       Family History   Problem Relation Age of Onset    Asthma Mother        Current Outpatient Medications   Medication Sig Dispense Refill    norethindrone (MENA) 0.35 MG tablet Take 1 tablet by mouth daily 28 tablet 11     No current facility-administered medications for this visit.       No Known Allergies          There is no immunization history on file for this patient.    Review of

## 2024-06-10 ENCOUNTER — OFFICE VISIT (OUTPATIENT)
Dept: SURGERY | Age: 34
End: 2024-06-10

## 2024-06-10 VITALS
DIASTOLIC BLOOD PRESSURE: 70 MMHG | HEART RATE: 86 BPM | WEIGHT: 184 LBS | HEIGHT: 62 IN | OXYGEN SATURATION: 98 % | SYSTOLIC BLOOD PRESSURE: 112 MMHG | BODY MASS INDEX: 33.86 KG/M2

## 2024-06-10 DIAGNOSIS — E04.1 THYROID NODULE: Primary | ICD-10-CM

## 2024-06-10 PROCEDURE — 99024 POSTOP FOLLOW-UP VISIT: CPT | Performed by: SURGERY

## 2024-06-10 NOTE — PROGRESS NOTES
No orders of the defined types were placed in this encounter.       Follow Up: No follow-ups on file.    HERBERT DELGADILLO MD

## 2024-08-13 ENCOUNTER — OFFICE VISIT (OUTPATIENT)
Dept: OBGYN | Age: 34
End: 2024-08-13
Payer: COMMERCIAL

## 2024-08-13 ENCOUNTER — HOSPITAL ENCOUNTER (OUTPATIENT)
Age: 34
Setting detail: SPECIMEN
Discharge: HOME | End: 2024-08-13
Payer: COMMERCIAL

## 2024-08-13 VITALS
HEIGHT: 62 IN | SYSTOLIC BLOOD PRESSURE: 171 MMHG | DIASTOLIC BLOOD PRESSURE: 97 MMHG | WEIGHT: 187 LBS | BODY MASS INDEX: 34.41 KG/M2

## 2024-08-13 DIAGNOSIS — Z01.419 ENCOUNTER FOR ANNUAL ROUTINE GYNECOLOGICAL EXAMINATION: Primary | ICD-10-CM

## 2024-08-13 DIAGNOSIS — I10 PRIMARY HYPERTENSION: ICD-10-CM

## 2024-08-13 DIAGNOSIS — N92.6 IRREGULAR MENSTRUAL CYCLE: ICD-10-CM

## 2024-08-13 PROCEDURE — 3077F SYST BP >= 140 MM HG: CPT

## 2024-08-13 PROCEDURE — 3080F DIAST BP >= 90 MM HG: CPT

## 2024-08-13 PROCEDURE — 99459 PELVIC EXAMINATION: CPT

## 2024-08-13 PROCEDURE — 87624 HPV HI-RISK TYP POOLED RSLT: CPT

## 2024-08-13 PROCEDURE — 99395 PREV VISIT EST AGE 18-39: CPT

## 2024-08-13 PROCEDURE — 88142 CYTOPATH C/V THIN LAYER: CPT

## 2024-08-13 RX ORDER — ACETAMINOPHEN AND CODEINE PHOSPHATE 120; 12 MG/5ML; MG/5ML
1 SOLUTION ORAL DAILY
Qty: 84 TABLET | Refills: 3 | Status: SHIPPED | OUTPATIENT
Start: 2024-08-13

## 2024-08-13 ASSESSMENT — ENCOUNTER SYMPTOMS
DIARRHEA: 0
NAUSEA: 0
CHEST TIGHTNESS: 0
GASTROINTESTINAL NEGATIVE: 1
VOMITING: 0
SHORTNESS OF BREATH: 0
CONSTIPATION: 0
ABDOMINAL PAIN: 0
RESPIRATORY NEGATIVE: 1

## 2024-08-13 NOTE — PROGRESS NOTES
Gastrointestinal: Negative.  Negative for abdominal pain, constipation, diarrhea, nausea and vomiting.   Genitourinary: Negative.  Negative for dyspareunia, dysuria, flank pain, frequency, genital sores, menstrual problem, pelvic pain, urgency, vaginal bleeding and vaginal discharge.   Neurological:  Negative for dizziness, seizures, weakness and headaches.   Psychiatric/Behavioral: Negative.         BP (!) 171/97   Ht 1.575 m (5' 2\")   Wt 84.8 kg (187 lb)   BMI 34.20 kg/m²     Physical Exam  Vitals and nursing note reviewed.   Constitutional:       Appearance: Normal appearance. She is normal weight.   HENT:      Head: Normocephalic.   Pulmonary:      Effort: Pulmonary effort is normal. No respiratory distress.   Chest:   Breasts:     Right: Normal. No mass, nipple discharge or skin change.      Left: Normal. No mass, nipple discharge or skin change.   Abdominal:      Palpations: Abdomen is soft.      Tenderness: There is no abdominal tenderness.   Genitourinary:     General: Normal vulva.      Exam position: Lithotomy position.      Labia:         Right: No rash, tenderness or lesion.         Left: No rash, tenderness or lesion.       Vagina: Normal. No vaginal discharge, erythema, tenderness, bleeding or lesions.      Cervix: No cervical motion tenderness, discharge, friability, lesion or erythema.      Uterus: Normal.       Adnexa: Right adnexa normal and left adnexa normal.      Rectum: Normal.   Musculoskeletal:         General: Normal range of motion.      Cervical back: Normal and normal range of motion.      Lumbar back: Normal.   Lymphadenopathy:      Cervical: No cervical adenopathy.      Upper Body:      Right upper body: No supraclavicular or axillary adenopathy.      Left upper body: No supraclavicular or axillary adenopathy.      Lower Body: No right inguinal adenopathy. No left inguinal adenopathy.   Skin:     General: Skin is warm and dry.   Neurological:      General: No focal deficit present.

## 2024-08-17 LAB — HPV HIGH RISK: NOT DETECTED

## (undated) DEVICE — SUTURE PERMAHAND SZ 3-0 L18IN NONABSORBABLE BLK L26MM SH C013D

## (undated) DEVICE — DRAIN SURG 7FR END PERF 1/8IN SIL RND SMOOTH HEAT POLISHED

## (undated) DEVICE — LINER,SEMI-RIGID,3000CC,50EA/CS: Brand: MEDLINE

## (undated) DEVICE — SYRINGE IRRIG 60ML SFT PLIABLE BLB EZ TO GRP 1 HND USE W/

## (undated) DEVICE — SUTURE PERMA-HAND SZ 3-0 L18IN 17 STRND NONABSORBABLE BLK SA64H

## (undated) DEVICE — Z DISCONTINUED USE 2220190 SUTURE VICRYL SZ 3-0 L27IN ABSRB UD L26MM SH 1/2 CIR J416H

## (undated) DEVICE — COUNTER NDL 30 COUNT FOAM STRP SGL MAG

## (undated) DEVICE — TUBING, SUCTION, 3/16" X 10', STRAIGHT: Brand: MEDLINE

## (undated) DEVICE — Z INACTIVE USE 2863041 SPONGE GZ W4XL4IN 100% COT 16 PLY RADPQ HIGHLY ABSRB

## (undated) DEVICE — Z INACTIVE USE 2855094 SPONGE SURG W3 8IN WHT COT RND PNUT DISECT W COUNT CRD RADPQ

## (undated) DEVICE — PENCIL ES CRD L10FT HND SWCHING ROCK SWCH W/ EDGE COAT BLDE

## (undated) DEVICE — HANDPIECE SEAL 44X5CM VES PSTL GRP LIGASURE ADV

## (undated) DEVICE — RESERVOIR,SUCTION,100CC,SILICONE: Brand: MEDLINE

## (undated) DEVICE — 3M™ STERI-DRAPE™ INSTRUMENT POUCH 1018: Brand: STERI-DRAPE™

## (undated) DEVICE — PROTECTOR EYE PT SELF ADH NS OPT GRD LF

## (undated) DEVICE — SOLUTION IV IRRIG WATER 1000ML POUR BRL 2F7114

## (undated) DEVICE — ELECTRODE ES AD CRDLSS PT RET REM POLYHESIVE

## (undated) DEVICE — SUTURE PERMAHAND SZ 2-0 L18IN NONABSORBABLE BLK L26MM SH C012D

## (undated) DEVICE — PACK,BASIC,IX: Brand: MEDLINE

## (undated) DEVICE — SUTURE MONOCRYL SZ 4-0 L18IN ABSRB UD L19MM PS-2 3/8 CIR PRIM Y496G

## (undated) DEVICE — INTENDED FOR TISSUE SEPARATION, AND OTHER PROCEDURES THAT REQUIRE A SHARP SURGICAL BLADE TO PUNCTURE OR CUT.: Brand: BARD-PARKER ® STAINLESS STEEL BLADES

## (undated) DEVICE — GLOVE SURG SZ 6 THK91MIL LTX FREE SYN POLYISOPRENE ANTI

## (undated) DEVICE — GOWN,SIRUS,POLYRNF,BRTHSLV,XLN/XL,20/CS: Brand: MEDLINE

## (undated) DEVICE — SHEET,T,THYROID,STERILE: Brand: MEDLINE

## (undated) DEVICE — SPONGE GZ W4XL8IN COT WVN 12 PLY

## (undated) DEVICE — TOWEL,OR,DSP,ST,BLUE,STD,6/PK,12PK/CS: Brand: MEDLINE

## (undated) DEVICE — Z INACTIVE NO ACTIVE SUPPLIER APPLICATOR MEDICATED 26 CC TINT HI-LITE ORNG STRL CHLORAPREP

## (undated) DEVICE — YANKAUER,FLEXIBLE HANDLE,REGLR CAPACITY: Brand: MEDLINE INDUSTRIES, INC.

## (undated) DEVICE — ELECTRODE ES L2.75IN S STL INSUL BLDE W/ SL EDGE

## (undated) DEVICE — MARKER SURG SKIN UTIL REGULAR/FINE 2 TIP W/ RUL AND 9 LBL

## (undated) DEVICE — ADHESIVE SKIN CLSR 0.7ML TOP DERMBND ADV